# Patient Record
Sex: MALE | Race: OTHER
[De-identification: names, ages, dates, MRNs, and addresses within clinical notes are randomized per-mention and may not be internally consistent; named-entity substitution may affect disease eponyms.]

---

## 2023-12-14 ENCOUNTER — APPOINTMENT (OUTPATIENT)
Dept: TRANSGENDER CARE | Facility: CLINIC | Age: 28
End: 2023-12-14
Payer: COMMERCIAL

## 2023-12-14 VITALS
HEART RATE: 88 BPM | DIASTOLIC BLOOD PRESSURE: 82 MMHG | HEIGHT: 65 IN | TEMPERATURE: 98.5 F | SYSTOLIC BLOOD PRESSURE: 106 MMHG | OXYGEN SATURATION: 98 % | WEIGHT: 176 LBS | BODY MASS INDEX: 29.32 KG/M2

## 2023-12-14 DIAGNOSIS — Z80.0 FAMILY HISTORY OF MALIGNANT NEOPLASM OF DIGESTIVE ORGANS: ICD-10-CM

## 2023-12-14 DIAGNOSIS — F64.9 GENDER IDENTITY DISORDER, UNSPECIFIED: ICD-10-CM

## 2023-12-14 DIAGNOSIS — E03.9 HYPOTHYROIDISM, UNSPECIFIED: ICD-10-CM

## 2023-12-14 PROBLEM — Z00.00 ENCOUNTER FOR PREVENTIVE HEALTH EXAMINATION: Status: ACTIVE | Noted: 2023-12-14

## 2023-12-14 PROCEDURE — 99205 OFFICE O/P NEW HI 60 MIN: CPT | Mod: 25

## 2023-12-14 RX ORDER — SYRINGE, DISPOSABLE, 1 ML
1 ML SYRINGE, EMPTY DISPOSABLE MISCELLANEOUS
Qty: 30 | Refills: 2 | Status: ACTIVE | COMMUNITY
Start: 2023-12-14 | End: 1900-01-01

## 2023-12-14 RX ORDER — NEEDLES, DISPOSABLE 25GX5/8"
23G X 1" NEEDLE, DISPOSABLE MISCELLANEOUS
Qty: 30 | Refills: 2 | Status: ACTIVE | COMMUNITY
Start: 2023-12-14 | End: 1900-01-01

## 2023-12-14 RX ORDER — NEEDLES, DISPOSABLE 25GX5/8"
18G X 1" NEEDLE, DISPOSABLE MISCELLANEOUS
Qty: 1 | Refills: 2 | Status: ACTIVE | COMMUNITY
Start: 2023-12-14 | End: 1900-01-01

## 2023-12-14 NOTE — HISTORY OF PRESENT ILLNESS
[de-identified] : JOSE ALOZNO is a 28 year old person seen on 12/14/2023 for initial transgender care program intake visit.  Pronouns: he/him Sexual orientation: straight Gender identity: male Assigned at birth: AFAB Organ inventory: s/p top and bottom surgery, s/p hysterectomy, oophorectomy  Specific Terms for Body Parts: ok with whatever  Call pt: Jose    Partner Status: in a relatinship with a cis female  Social history: lives with mom  Transition HX + Family Support + Present Life: Started T at age 18. Got top surgery at 18. Hysterectomy age 20. Metoidioplasty in 2021 at age 26.   Who is aware: Stealth so everyone   Education | Employment: Working as an [employer]  and other [employer] apps, which are paying for school [employer] studying finance   Hobbies/Activities: Gym   Tattoos/ Piercing: Denies   Cigarette, Vaping, Marijuana, Alcohol, and Drug Use: Denies, occasional EtOh 1x a year   Medical and mental health history: DX with subclinical hypothyroid progressed to regular hypothyroid follows antonio Thompson at [facility] but has not yet started Synthroid as he has issues with taking pills  Mental Health dx: Sees a therapist sporadically a few times a month or once a month for over 10 years   History of mental health admission: Denies   History of Suicidal/homicidal ideation & Self harm: Denies  Sexual health:  Active with: Monogamous with cis female partner Declines new testing today   Last HIV test: A few years ago   Last STI tests and sites: A few years ago    Gender:  Feelings of Gender Dysphoria/ Body Dysmorphia: Very little at this time   Reproductive Endocrinology: Was not interested did not do   Gender Affirming Surgery: s/p top, bottom  Binding/Tucking: N/A  Name Change + Gender Marker: Already done   Transition goals:   Other: establish PCP and new HRT/endo as prior left practice   Last injection was almost 3 weeks ago - on q2 week cycle  Mom does shots and sometimes they can't coordinate schedules but he tries to never go above 4 weeks

## 2023-12-14 NOTE — ASSESSMENT
[FreeTextEntry1] : #[facility] Patient transitioned > 10 years ago and is fully medically, surgically, and socially transitioned. He is establishing care today for convenience to his home and his prior [facility] provider has left practice. Counseled today about importance of consistency with testosterone as his body no longer makes endogenous hormones and how critical this is for his future health and he verbalizes understanding. check levels today and refills provided.  #HCM Declines flu shot today Baseline labs ordered  Will need age appropriate cancer screening - fhx of colon cancer  #?Hypothyroid Patient reports h/o subclinical hypothyroid that then progressed to true hypothyroid. he was following this with endo. He has been rx synthroid but he has not taken it yet as he struggles with pills. He is trying to continue care w his prior endo but if he can not he will estalbish here with our endocrinologist, will let us know PRN

## 2023-12-15 LAB
ALBUMIN SERPL ELPH-MCNC: 4.8 G/DL
ALP BLD-CCNC: 125 U/L
ALT SERPL-CCNC: 20 U/L
ANION GAP SERPL CALC-SCNC: 13 MMOL/L
AST SERPL-CCNC: 22 U/L
BASOPHILS # BLD AUTO: 0.04 K/UL
BASOPHILS NFR BLD AUTO: 0.7 %
BILIRUB SERPL-MCNC: 0.5 MG/DL
BUN SERPL-MCNC: 14 MG/DL
CALCIUM SERPL-MCNC: 9.9 MG/DL
CHLORIDE SERPL-SCNC: 104 MMOL/L
CHOLEST SERPL-MCNC: 193 MG/DL
CO2 SERPL-SCNC: 22 MMOL/L
CREAT SERPL-MCNC: 0.94 MG/DL
EGFR: 115 ML/MIN/1.73M2
EOSINOPHIL # BLD AUTO: 0.13 K/UL
EOSINOPHIL NFR BLD AUTO: 2.4 %
ESTIMATED AVERAGE GLUCOSE: 103 MG/DL
ESTRADIOL SERPL-MCNC: 15 PG/ML
FSH SERPL-MCNC: 67.4 IU/L
GLUCOSE SERPL-MCNC: 96 MG/DL
HBA1C MFR BLD HPLC: 5.2 %
HCT VFR BLD CALC: 47.6 %
HDLC SERPL-MCNC: 46 MG/DL
HGB BLD-MCNC: 15.6 G/DL
IMM GRANULOCYTES NFR BLD AUTO: 0.2 %
LDLC SERPL CALC-MCNC: 136 MG/DL
LH SERPL-ACNC: 33.3 IU/L
LYMPHOCYTES # BLD AUTO: 1.5 K/UL
LYMPHOCYTES NFR BLD AUTO: 27.2 %
MAN DIFF?: NORMAL
MCHC RBC-ENTMCNC: 29.3 PG
MCHC RBC-ENTMCNC: 32.8 GM/DL
MCV RBC AUTO: 89.3 FL
MONOCYTES # BLD AUTO: 0.45 K/UL
MONOCYTES NFR BLD AUTO: 8.2 %
NEUTROPHILS # BLD AUTO: 3.39 K/UL
NEUTROPHILS NFR BLD AUTO: 61.3 %
NONHDLC SERPL-MCNC: 147 MG/DL
PLATELET # BLD AUTO: 330 K/UL
POTASSIUM SERPL-SCNC: 4.7 MMOL/L
PROT SERPL-MCNC: 8 G/DL
RBC # BLD: 5.33 M/UL
RBC # FLD: 12.7 %
SODIUM SERPL-SCNC: 140 MMOL/L
T4 FREE SERPL-MCNC: 1.4 NG/DL
TESTOST SERPL-MCNC: 102 NG/DL
TRIGL SERPL-MCNC: 59 MG/DL
TSH SERPL-ACNC: 5.22 UIU/ML
WBC # FLD AUTO: 5.52 K/UL

## 2024-01-01 ENCOUNTER — TRANSCRIPTION ENCOUNTER (OUTPATIENT)
Age: 29
End: 2024-01-01

## 2024-06-19 RX ORDER — TESTOSTERONE CYPIONATE 200 MG/ML
200 INJECTION, SOLUTION INTRAMUSCULAR
Qty: 10 | Refills: 0 | Status: ACTIVE | COMMUNITY
Start: 1900-01-01 | End: 1900-01-01

## 2024-08-15 ENCOUNTER — APPOINTMENT (OUTPATIENT)
Dept: TRANSGENDER CARE | Facility: CLINIC | Age: 29
End: 2024-08-15
Payer: COMMERCIAL

## 2024-08-15 VITALS
BODY MASS INDEX: 28.32 KG/M2 | HEART RATE: 89 BPM | SYSTOLIC BLOOD PRESSURE: 116 MMHG | TEMPERATURE: 98.7 F | DIASTOLIC BLOOD PRESSURE: 76 MMHG | WEIGHT: 170 LBS | OXYGEN SATURATION: 98 % | HEIGHT: 65 IN

## 2024-08-15 DIAGNOSIS — F64.9 GENDER IDENTITY DISORDER, UNSPECIFIED: ICD-10-CM

## 2024-08-15 DIAGNOSIS — E03.8 OTHER SPECIFIED HYPOTHYROIDISM: ICD-10-CM

## 2024-08-15 PROCEDURE — 99215 OFFICE O/P EST HI 40 MIN: CPT

## 2024-08-15 PROCEDURE — G2211 COMPLEX E/M VISIT ADD ON: CPT

## 2024-08-15 NOTE — ASSESSMENT
[FreeTextEntry1] : We discussed extenisvely the importance of adherence to exogenous hormones given lack of endogenous hormones. He is aware. Reviewed options including testopel and xyosted. He is interested in xyosted potentially but wishes to try for another severeal months to adhere better. He is planning to complete labs mid cycle once he has been back on testosterone, no value in checking labs today due to long period without dose. Reviewed barriers. We will follow up when labs return to discuss plan. Regarding subclinical hypothyroidism, will defer treatment for now per patient preference as he is asymptomatic. Can consider treating in future if he develops symptoms or if progresses to true hypothyroid.

## 2024-08-15 NOTE — HISTORY OF PRESENT ILLNESS
[de-identified] : -- Here for f/u -- Recently returned from 3 week road trip to [place]  -- Trying to be better about consistency with T, rarely goes more than 2 weeks but now is over 3 weeks due -- Mom does shot for him and they can coordinate schedule can be hard -- Reports he has put on about 20 lbs in last few years but otherwise feels fine re: subclinical hypothyroidism. Not interested in medication. He is unable to take oral meds of any kind.

## 2024-11-13 ENCOUNTER — LABORATORY RESULT (OUTPATIENT)
Age: 29
End: 2024-11-13

## 2024-11-14 ENCOUNTER — TRANSCRIPTION ENCOUNTER (OUTPATIENT)
Age: 29
End: 2024-11-14

## 2024-11-20 ENCOUNTER — TRANSCRIPTION ENCOUNTER (OUTPATIENT)
Age: 29
End: 2024-11-20

## 2025-03-14 ENCOUNTER — TRANSCRIPTION ENCOUNTER (OUTPATIENT)
Age: 30
End: 2025-03-14